# Patient Record
Sex: MALE | Race: BLACK OR AFRICAN AMERICAN | URBAN - METROPOLITAN AREA
[De-identification: names, ages, dates, MRNs, and addresses within clinical notes are randomized per-mention and may not be internally consistent; named-entity substitution may affect disease eponyms.]

---

## 2017-03-22 ENCOUNTER — EMERGENCY (EMERGENCY)
Facility: HOSPITAL | Age: 42
LOS: 1 days | Discharge: HOME | End: 2017-03-22
Attending: EMERGENCY MEDICINE | Admitting: EMERGENCY MEDICINE
Payer: COMMERCIAL

## 2017-03-22 VITALS
RESPIRATION RATE: 20 BRPM | TEMPERATURE: 98 F | HEIGHT: 76 IN | HEART RATE: 20 BPM | SYSTOLIC BLOOD PRESSURE: 144 MMHG | WEIGHT: 315 LBS | DIASTOLIC BLOOD PRESSURE: 85 MMHG | OXYGEN SATURATION: 97 %

## 2017-03-22 VITALS
SYSTOLIC BLOOD PRESSURE: 123 MMHG | RESPIRATION RATE: 16 BRPM | DIASTOLIC BLOOD PRESSURE: 84 MMHG | HEART RATE: 84 BPM | TEMPERATURE: 98 F | OXYGEN SATURATION: 98 %

## 2017-03-22 PROCEDURE — 73110 X-RAY EXAM OF WRIST: CPT | Mod: 26,LT

## 2017-03-22 PROCEDURE — 73110 X-RAY EXAM OF WRIST: CPT

## 2017-03-22 PROCEDURE — 99283 EMERGENCY DEPT VISIT LOW MDM: CPT | Mod: 25

## 2017-03-22 PROCEDURE — 93005 ELECTROCARDIOGRAM TRACING: CPT

## 2017-03-22 PROCEDURE — 93010 ELECTROCARDIOGRAM REPORT: CPT

## 2017-03-22 PROCEDURE — 99284 EMERGENCY DEPT VISIT MOD MDM: CPT | Mod: 25

## 2017-03-22 RX ORDER — IBUPROFEN 200 MG
600 TABLET ORAL ONCE
Qty: 0 | Refills: 0 | Status: COMPLETED | OUTPATIENT
Start: 2017-03-22 | End: 2017-03-22

## 2017-03-22 RX ADMIN — Medication 600 MILLIGRAM(S): at 06:07

## 2017-03-22 NOTE — ED PROVIDER NOTE - OBJECTIVE STATEMENT
40 yo male with lt wrist pain which started after working out feels pain going up to elbow no cp or sob or other complaints  pt states he can flex his wrist totally

## 2017-03-22 NOTE — ED ADULT NURSE NOTE - OBJECTIVE STATEMENT
"I was lifting weight my wrists started to hurt. 2 days ago it happened. pain came back today while I was driving. the pain is going up my arm now." pt has no signs of trauma to left forearm (source of pain). + and = radial pulses. pt denies numbness, tingling. pt has no other complaints. a and o x3. breathing even and unlabored. will continue to monitor.

## 2017-03-22 NOTE — ED ADULT NURSE NOTE - CHPI ED SYMPTOMS NEG
no back pain/no fever/no weakness/no difficulty bearing weight/no tingling/no bruising/no numbness/no deformity/no abrasion

## 2017-05-15 ENCOUNTER — EMERGENCY (EMERGENCY)
Facility: HOSPITAL | Age: 42
LOS: 1 days | Discharge: HOME | End: 2017-05-15
Attending: EMERGENCY MEDICINE
Payer: COMMERCIAL

## 2017-05-15 VITALS
DIASTOLIC BLOOD PRESSURE: 77 MMHG | HEART RATE: 85 BPM | SYSTOLIC BLOOD PRESSURE: 139 MMHG | OXYGEN SATURATION: 96 % | WEIGHT: 309.97 LBS | RESPIRATION RATE: 19 BRPM | TEMPERATURE: 98 F | HEIGHT: 77 IN

## 2017-05-15 DIAGNOSIS — E11.9 TYPE 2 DIABETES MELLITUS WITHOUT COMPLICATIONS: ICD-10-CM

## 2017-05-15 DIAGNOSIS — M54.5 LOW BACK PAIN: ICD-10-CM

## 2017-05-15 PROCEDURE — 99284 EMERGENCY DEPT VISIT MOD MDM: CPT

## 2017-05-15 NOTE — ED ADULT TRIAGE NOTE - CHIEF COMPLAINT QUOTE
pt c/o right leg and lower back pain, pain began with cramping and now its worsening over past 3 days.

## 2017-05-16 PROCEDURE — 99284 EMERGENCY DEPT VISIT MOD MDM: CPT | Mod: 25

## 2017-05-16 PROCEDURE — 72110 X-RAY EXAM L-2 SPINE 4/>VWS: CPT | Mod: 26

## 2017-05-16 PROCEDURE — 96372 THER/PROPH/DIAG INJ SC/IM: CPT

## 2017-05-16 PROCEDURE — 72110 X-RAY EXAM L-2 SPINE 4/>VWS: CPT

## 2017-05-16 RX ORDER — KETOROLAC TROMETHAMINE 30 MG/ML
60 SYRINGE (ML) INJECTION ONCE
Qty: 0 | Refills: 0 | Status: DISCONTINUED | OUTPATIENT
Start: 2017-05-16 | End: 2017-05-16

## 2017-05-16 RX ORDER — METHOCARBAMOL 500 MG/1
2 TABLET, FILM COATED ORAL
Qty: 24 | Refills: 0 | OUTPATIENT
Start: 2017-05-16 | End: 2017-05-20

## 2017-05-16 RX ORDER — METHOCARBAMOL 500 MG/1
1500 TABLET, FILM COATED ORAL ONCE
Qty: 0 | Refills: 0 | Status: COMPLETED | OUTPATIENT
Start: 2017-05-16 | End: 2017-05-16

## 2017-05-16 RX ORDER — IBUPROFEN 200 MG
1 TABLET ORAL
Qty: 15 | Refills: 0 | OUTPATIENT
Start: 2017-05-16 | End: 2017-05-21

## 2017-05-16 RX ADMIN — Medication 50 MILLIGRAM(S): at 01:23

## 2017-05-16 RX ADMIN — METHOCARBAMOL 1500 MILLIGRAM(S): 500 TABLET, FILM COATED ORAL at 00:44

## 2017-05-16 RX ADMIN — Medication 60 MILLIGRAM(S): at 00:44

## 2017-05-16 NOTE — ED PROVIDER NOTE - MEDICAL DECISION MAKING DETAILS
PT IS A 42YO MALE WITH BACK PAIN, PROBABLE SCIATICA. WILL GIVE PREDNISONE, ROBAXIN AND TORADOL AND RE-EVALUATE.

## 2017-05-16 NOTE — ED PROVIDER NOTE - PHYSICAL EXAMINATION
BACK:NO SPINAL OR PARASPINAL TENDERNESS. + TTP OVER RIGHT GLUTEUS. FROM BACK, FROM BL LE. 5/5 STRENGTH BL LE. SENSATION IN BL LE GROSSLY INTACT. GAIT WNL. PV : + 2 DP R LE.

## 2017-05-16 NOTE — ED PROVIDER NOTE - OBJECTIVE STATEMENT
pt is a 42yo male with pmhx of DM on metformin, noncompliant, pt is a 40yo male with pmhx of DM on metformin, noncompliant with medication, c/o right lower back pain x fe wdays. pt is a 42yo male with pmhx of DM on metformin, noncompliant with medication, c/o right lower back pain x 2-3 days. pt reports pain started over buttocks and eventually radiated down the back of the right leg. pt endorses when he walks he feels cramps over his right buttock. pt states he took ibuprofen for the pain, last time this morning, which provided minimal relief. 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 [place]

## 2017-05-16 NOTE — ED PROVIDER NOTE - PROGRESS NOTE DETAILS
PT IMPROVED WITH MEDICATION. PT WALKING AROUND NORMALLY WITHOUT ISSUE. ADVISED PT HE MUST FOLLOW UP WITH [facility] AND PMD. WILL RX MEDROL DOSE PACK ( ADVISED PT TO WATCH HIS GLUCOSE), ROBAXIN AND MOTRIN. PT VERBALIZED UNDERSTANDING AND AGREEMENT WITH PLAN AND DX. WILL DC.

## 2017-05-16 NOTE — ED PROVIDER NOTE - ATTENDING CONTRIBUTION TO CARE
40 yo male with lowwer backpian 2 -3 days no trauma  pain radiates down buttck down leg   neurovasc intact + sciatica  tx with muscle relaxers and d/c  agree with pa assessment and plan

## 2017-05-16 NOTE — ED PROVIDER NOTE - NS ED ROS FT
pt denies fever, chills, cp, sob, numbness or tingling down extremities, saddle anesthesia, bowel or bladder incontinence.

## 2018-04-23 NOTE — ED PROVIDER NOTE - CROS ED CONS ALL NEG
"Niesha Adhikari's goals for this visit include: Evaluate right index finger fracture, DOI 1/30/18  She requests these members of her care team be copied on today's visit information: yes    PCP: Tanika Clark    Referring Provider:  Charlotte Rouse MD  [address] [facility] [address]  [address] [address]  [place] [address]    Chief Complaint   Patient presents with     Consult     Right index phalanx fx, DOI: 1/30/18       Initial /70  Pulse 68  Ht 1.626 m (5' 4\")  Wt 65.3 kg (144 lb)  LMP 11/27/2003  SpO2 98%  BMI 24.72 kg/m2 Estimated body mass index is 24.72 kg/(m^2) as calculated from the following:    Height as of this encounter: 1.626 m (5' 4\").    Weight as of this encounter: 65.3 kg (144 lb).  Medication Reconciliation: complete     Hand Evaluation    Pain Score (1-10): Moderate Pain (5)  Hand Dominance: Right  QuickDASH Disability Score: 63.64              " negative...

## 2019-02-09 ENCOUNTER — EMERGENCY (EMERGENCY)
Facility: HOSPITAL | Age: 44
LOS: 1 days | Discharge: HOME | End: 2019-02-09
Attending: EMERGENCY MEDICINE
Payer: COMMERCIAL

## 2019-02-09 VITALS
DIASTOLIC BLOOD PRESSURE: 97 MMHG | SYSTOLIC BLOOD PRESSURE: 164 MMHG | HEIGHT: 76 IN | OXYGEN SATURATION: 99 % | HEART RATE: 63 BPM | TEMPERATURE: 98 F | WEIGHT: 298.06 LBS | RESPIRATION RATE: 18 BRPM

## 2019-02-09 PROBLEM — E11.9 TYPE 2 DIABETES MELLITUS WITHOUT COMPLICATIONS: Chronic | Status: ACTIVE | Noted: 2017-05-16

## 2019-02-09 PROCEDURE — 99283 EMERGENCY DEPT VISIT LOW MDM: CPT

## 2019-02-09 PROCEDURE — 82962 GLUCOSE BLOOD TEST: CPT

## 2019-02-09 PROCEDURE — 99284 EMERGENCY DEPT VISIT MOD MDM: CPT

## 2019-02-09 RX ORDER — METFORMIN HYDROCHLORIDE 850 MG/1
1 TABLET ORAL
Qty: 42 | Refills: 0 | OUTPATIENT
Start: 2019-02-09 | End: 2019-03-01

## 2019-02-09 NOTE — ED STATDOCS - MEDICAL DECISION MAKING DETAILS
pt with elevated blood sugar, will send prescription for Metformin, urge pt to follow up with primary care.

## 2019-02-09 NOTE — ED STATDOCS - PROGRESS NOTE DETAILS
Pt. also was informed that his BP was elevated and that should also be evaluated by his PMD this week. Pt. with no chest pain or SOB. No abdominal pain.

## 2019-02-09 NOTE — ED STATDOCS - OBJECTIVE STATEMENT
42 y/o M pt with PMHx pf DM (Metformin, 500mg TID) presents to ED c/o polyuria and polydipsia that onset 2 days ago. Pt reports he has been non compliant with Metformin for 3 days. Also notes associated sx of headache. Reports his blood glucose machine broke and has been unable to check his glucose levels. Denies nausea, vomiting, abdominal pain. No further acute complaints at this time.

## 2019-04-24 ENCOUNTER — EMERGENCY (EMERGENCY)
Facility: HOSPITAL | Age: 44
LOS: 1 days | Discharge: HOME | End: 2019-04-24
Attending: EMERGENCY MEDICINE
Payer: COMMERCIAL

## 2019-04-24 PROCEDURE — 99284 EMERGENCY DEPT VISIT MOD MDM: CPT | Mod: 25

## 2019-04-25 VITALS
RESPIRATION RATE: 19 BRPM | HEART RATE: 58 BPM | OXYGEN SATURATION: 99 % | TEMPERATURE: 99 F | DIASTOLIC BLOOD PRESSURE: 95 MMHG | SYSTOLIC BLOOD PRESSURE: 155 MMHG

## 2019-04-25 VITALS
HEART RATE: 60 BPM | SYSTOLIC BLOOD PRESSURE: 118 MMHG | TEMPERATURE: 98 F | RESPIRATION RATE: 18 BRPM | WEIGHT: 315 LBS | HEIGHT: 75 IN | OXYGEN SATURATION: 96 % | DIASTOLIC BLOOD PRESSURE: 83 MMHG

## 2019-04-25 LAB
ALBUMIN SERPL ELPH-MCNC: 4.2 G/DL — SIGNIFICANT CHANGE UP (ref 3.3–5.2)
ALP SERPL-CCNC: 77 U/L — SIGNIFICANT CHANGE UP (ref 40–120)
ALT FLD-CCNC: 37 U/L — SIGNIFICANT CHANGE UP
ANION GAP SERPL CALC-SCNC: 11 MMOL/L — SIGNIFICANT CHANGE UP (ref 5–17)
APTT BLD: 31.2 SEC — SIGNIFICANT CHANGE UP (ref 27.5–36.3)
AST SERPL-CCNC: 15 U/L — SIGNIFICANT CHANGE UP
BILIRUB SERPL-MCNC: 0.7 MG/DL — SIGNIFICANT CHANGE UP (ref 0.4–2)
BUN SERPL-MCNC: 14 MG/DL — SIGNIFICANT CHANGE UP (ref 8–20)
CALCIUM SERPL-MCNC: 9.1 MG/DL — SIGNIFICANT CHANGE UP (ref 8.6–10.2)
CHLORIDE SERPL-SCNC: 104 MMOL/L — SIGNIFICANT CHANGE UP (ref 98–107)
CK MB CFR SERPL CALC: 2.7 NG/ML — SIGNIFICANT CHANGE UP (ref 0–6.7)
CK SERPL-CCNC: 248 U/L — HIGH (ref 30–200)
CO2 SERPL-SCNC: 24 MMOL/L — SIGNIFICANT CHANGE UP (ref 22–29)
CREAT SERPL-MCNC: 0.86 MG/DL — SIGNIFICANT CHANGE UP (ref 0.5–1.3)
EOSINOPHIL NFR BLD AUTO: 1 % — SIGNIFICANT CHANGE UP (ref 0–6)
GLUCOSE SERPL-MCNC: 222 MG/DL — HIGH (ref 70–115)
HCT VFR BLD CALC: 44.5 % — SIGNIFICANT CHANGE UP (ref 42–52)
HGB BLD-MCNC: 15 G/DL — SIGNIFICANT CHANGE UP (ref 14–18)
INR BLD: 1.03 RATIO — SIGNIFICANT CHANGE UP (ref 0.88–1.16)
LYMPHOCYTES # BLD AUTO: 57 % — HIGH (ref 20–55)
MCHC RBC-ENTMCNC: 27 PG — SIGNIFICANT CHANGE UP (ref 27–31)
MCHC RBC-ENTMCNC: 33.7 G/DL — SIGNIFICANT CHANGE UP (ref 32–36)
MCV RBC AUTO: 80.2 FL — SIGNIFICANT CHANGE UP (ref 80–94)
MONOCYTES NFR BLD AUTO: 6 % — SIGNIFICANT CHANGE UP (ref 3–10)
NEUTROPHILS NFR BLD AUTO: 36 % — LOW (ref 37–73)
PLAT MORPH BLD: NORMAL — SIGNIFICANT CHANGE UP
PLATELET # BLD AUTO: 210 K/UL — SIGNIFICANT CHANGE UP (ref 150–400)
POTASSIUM SERPL-MCNC: 3.9 MMOL/L — SIGNIFICANT CHANGE UP (ref 3.5–5.3)
POTASSIUM SERPL-SCNC: 3.9 MMOL/L — SIGNIFICANT CHANGE UP (ref 3.5–5.3)
PROT SERPL-MCNC: 7.5 G/DL — SIGNIFICANT CHANGE UP (ref 6.6–8.7)
PROTHROM AB SERPL-ACNC: 11.9 SEC — SIGNIFICANT CHANGE UP (ref 10–12.9)
RBC # BLD: 5.55 M/UL — SIGNIFICANT CHANGE UP (ref 4.6–6.2)
RBC # FLD: 14 % — SIGNIFICANT CHANGE UP (ref 11–15.6)
RBC BLD AUTO: NORMAL — SIGNIFICANT CHANGE UP
SODIUM SERPL-SCNC: 139 MMOL/L — SIGNIFICANT CHANGE UP (ref 135–145)
TROPONIN T SERPL-MCNC: <0.01 NG/ML — SIGNIFICANT CHANGE UP (ref 0–0.06)
WBC # BLD: 5.3 K/UL — SIGNIFICANT CHANGE UP (ref 4.8–10.8)
WBC # FLD AUTO: 5.3 K/UL — SIGNIFICANT CHANGE UP (ref 4.8–10.8)

## 2019-04-25 PROCEDURE — 82553 CREATINE MB FRACTION: CPT

## 2019-04-25 PROCEDURE — 36415 COLL VENOUS BLD VENIPUNCTURE: CPT

## 2019-04-25 PROCEDURE — 93010 ELECTROCARDIOGRAM REPORT: CPT

## 2019-04-25 PROCEDURE — 82962 GLUCOSE BLOOD TEST: CPT

## 2019-04-25 PROCEDURE — 84484 ASSAY OF TROPONIN QUANT: CPT

## 2019-04-25 PROCEDURE — 85730 THROMBOPLASTIN TIME PARTIAL: CPT

## 2019-04-25 PROCEDURE — 80053 COMPREHEN METABOLIC PANEL: CPT

## 2019-04-25 PROCEDURE — 99283 EMERGENCY DEPT VISIT LOW MDM: CPT

## 2019-04-25 PROCEDURE — 85027 COMPLETE CBC AUTOMATED: CPT

## 2019-04-25 PROCEDURE — 71045 X-RAY EXAM CHEST 1 VIEW: CPT

## 2019-04-25 PROCEDURE — 93005 ELECTROCARDIOGRAM TRACING: CPT

## 2019-04-25 PROCEDURE — 82550 ASSAY OF CK (CPK): CPT

## 2019-04-25 PROCEDURE — 85610 PROTHROMBIN TIME: CPT

## 2019-04-25 PROCEDURE — 71045 X-RAY EXAM CHEST 1 VIEW: CPT | Mod: 26

## 2019-04-25 RX ORDER — METFORMIN HYDROCHLORIDE 850 MG/1
500 TABLET ORAL ONCE
Qty: 0 | Refills: 0 | Status: COMPLETED | OUTPATIENT
Start: 2019-04-25 | End: 2019-04-25

## 2019-04-25 NOTE — ED PROVIDER NOTE - OBJECTIVE STATEMENT
44 y/o M with PMHx of DM (on Metformin- unsure of dosage) presents to ED c/o intermittent episodes of shooting chest pain with radiation into the L shoulder and arm onset today. Notes he recently had the flu, but his flu symptoms have resolved. Admits to occasional EtOH use and cigarettes. Denies illicit drug use. No recent fevers or chills, abdominal pain, n/v/d, shortness of breath, diaphoresis, dizziness, back pain, or syncope. No hx of cardiac disease. No further acute complaints at this time.

## 2019-04-25 NOTE — ED ADULT NURSE NOTE - OBJECTIVE STATEMENT
PAtient presented to Ed With [facility]. complaining of left arm pain as per patient he thinks he is getting this pain secondary to missed medication dose.

## 2019-04-25 NOTE — ED PROVIDER NOTE - CHPI ED SYMPTOMS NEG
no shortness of breath/abdominal pain, n/v/d, diaphoresis, dizziness/no syncope/no back pain/no chills

## 2019-04-25 NOTE — ED ADULT TRIAGE NOTE - CHIEF COMPLAINT QUOTE
Pt states "I was driving home from work and I didn't take my medication and I started to get pain across my chest and down my left shoulder", denies SOB, denies N/V, pt accompanied by [facility][id number]

## 2019-04-25 NOTE — ED ADULT NURSE NOTE - CHPI ED NUR SYMPTOMS NEG
no chills/no weakness/no vomiting/no decreased eating/drinking/no pain/no dizziness/no fever/no nausea/no tingling

## 2019-04-25 NOTE — ED ADULT NURSE NOTE - CHIEF COMPLAINT QUOTE
Pt states "I was driving home from work and I didn't take my medication and I started to get pain across my chest and down my left shoulder", denies SOB, denies N/V, pt accompanied by [facility] #[id number]

## 2023-01-31 NOTE — ED PROVIDER NOTE - PMH
DM (diabetes mellitus)
Fiorella Mckeon (DO)  Obstetrics and Gynecology  [address] [facility], [address] [place] [address]  Phone: [phone]  Fax: [phone]  Follow Up Time: 1 month

## 2024-08-20 DIAGNOSIS — Z12.11 ENCOUNTER FOR SCREENING FOR MALIGNANT NEOPLASM OF COLON: ICD-10-CM

## 2024-08-20 PROBLEM — Z00.00 ENCOUNTER FOR PREVENTIVE HEALTH EXAMINATION: Status: ACTIVE | Noted: 2024-08-20

## 2024-10-16 NOTE — ED STATDOCS - CPE ED [facility] NORM
Call placed to [employer] to discuss medical billing approval and needed co pay costs    Spoke with [employer] product service not covered/pharmacy not covered?   Reviewed in billing. Verifying account and ability to bill medically.   They requested this RN call back tomorrow to discuss after review completed.     Pt updated            [employer] pay assistance enrolled today with pt.     BIN:  [id number]  PCN:  [id number]  GROUP:  [id number]  Patient ID  [id number]           normal...

## 2024-12-02 NOTE — ED ADULT NURSE NOTE - CAS DISCH CONDITION
Per Dr. Ariza- can get back on Benlysta and stop Cellcept. No need for taper. Therapy plan entered for Benlysta. Infusion coordinator informed.    Stable